# Patient Record
Sex: MALE | Race: WHITE | NOT HISPANIC OR LATINO
[De-identification: names, ages, dates, MRNs, and addresses within clinical notes are randomized per-mention and may not be internally consistent; named-entity substitution may affect disease eponyms.]

---

## 2018-06-23 ENCOUNTER — TRANSCRIPTION ENCOUNTER (OUTPATIENT)
Age: 38
End: 2018-06-23

## 2020-11-10 ENCOUNTER — TRANSCRIPTION ENCOUNTER (OUTPATIENT)
Age: 40
End: 2020-11-10

## 2022-06-16 ENCOUNTER — EMERGENCY (EMERGENCY)
Facility: HOSPITAL | Age: 42
LOS: 1 days | Discharge: HOME | End: 2022-06-16
Admitting: STUDENT IN AN ORGANIZED HEALTH CARE EDUCATION/TRAINING PROGRAM
Payer: COMMERCIAL

## 2022-06-16 VITALS
HEIGHT: 71 IN | DIASTOLIC BLOOD PRESSURE: 88 MMHG | HEART RATE: 75 BPM | RESPIRATION RATE: 16 BRPM | SYSTOLIC BLOOD PRESSURE: 124 MMHG | WEIGHT: 160.06 LBS | TEMPERATURE: 98 F | OXYGEN SATURATION: 97 %

## 2022-06-16 DIAGNOSIS — I86.1 SCROTAL VARICES: ICD-10-CM

## 2022-06-16 DIAGNOSIS — Z91.011 ALLERGY TO MILK PRODUCTS: ICD-10-CM

## 2022-06-16 DIAGNOSIS — R10.2 PELVIC AND PERINEAL PAIN: ICD-10-CM

## 2022-06-16 LAB
APPEARANCE UR: CLEAR — SIGNIFICANT CHANGE UP
BACTERIA # UR AUTO: PRESENT /HPF
BILIRUB UR-MCNC: NEGATIVE — SIGNIFICANT CHANGE UP
COLOR SPEC: YELLOW — SIGNIFICANT CHANGE UP
DIFF PNL FLD: ABNORMAL
EPI CELLS # UR: SIGNIFICANT CHANGE UP /HPF (ref 0–5)
GLUCOSE UR QL: NEGATIVE — SIGNIFICANT CHANGE UP
KETONES UR-MCNC: ABNORMAL MG/DL
LEUKOCYTE ESTERASE UR-ACNC: NEGATIVE — SIGNIFICANT CHANGE UP
NITRITE UR-MCNC: NEGATIVE — SIGNIFICANT CHANGE UP
PH UR: 6.5 — SIGNIFICANT CHANGE UP (ref 5–8)
PROT UR-MCNC: NEGATIVE MG/DL — SIGNIFICANT CHANGE UP
RBC CASTS # UR COMP ASSIST: < 5 /HPF — SIGNIFICANT CHANGE UP
SP GR SPEC: 1.01 — SIGNIFICANT CHANGE UP (ref 1–1.03)
UROBILINOGEN FLD QL: 0.2 E.U./DL — SIGNIFICANT CHANGE UP
WBC UR QL: < 5 /HPF — SIGNIFICANT CHANGE UP

## 2022-06-16 PROCEDURE — 76870 US EXAM SCROTUM: CPT

## 2022-06-16 PROCEDURE — 81001 URINALYSIS AUTO W/SCOPE: CPT

## 2022-06-16 PROCEDURE — 99284 EMERGENCY DEPT VISIT MOD MDM: CPT | Mod: 25

## 2022-06-16 PROCEDURE — 99284 EMERGENCY DEPT VISIT MOD MDM: CPT

## 2022-06-16 PROCEDURE — 76870 US EXAM SCROTUM: CPT | Mod: 26

## 2022-06-16 NOTE — ED PROVIDER NOTE - NSFOLLOWUPINSTRUCTIONS_ED_ALL_ED_FT
Take tylenol 650mg or motrin 400-800mg as needed every 4-6 hours for pain.   You should wear compression briefs to keep scrotum supported    Please call to make appointment in urology clinic within one week located at [address] [place] [address]  [phone]    You may call our referrals coordinator at [phone] Monday to Friday 11am-7pm for assistance with making an appointment      Varicocele       A varicocele is a swelling of veins in the scrotum. The scrotum is the sac that contains the testicles. Varicoceles can occur on either side of the scrotum, but they are more common on the left side. They occur most often in teenage boys and young men.    In most cases, varicoceles are not a serious problem. They are usually small and painless and do not require treatment. Tests may be done to confirm the diagnosis. Treatment may be needed if:  •A varicocele is large, causes a lot of pain, or causes pain when exercising.      •Varicoceles are found on both sides of the scrotum.      •A varicocele causes a decrease in the size of the testicle in a growing adolescent.      •The person has fertility problems.        What are the causes?    This condition is the result of valves in the veins not working properly. Valves in the veins help to return blood from the scrotum and testicles to the heart. If these valves do not work well, blood flows backward and backs up into the veins, which causes the veins to swell. This is similar to what happens when varicose veins form in the leg.      What are the signs or symptoms?    Most varicoceles do not cause any symptoms. If symptoms do occur, they may include:  •Swelling on one side of the scrotum. The swelling may be more obvious when you are standing up.      •A lumpy feeling in the scrotum.      •A heavy feeling on one side of the scrotum.      •A dull ache in the scrotum, especially after exercise or prolonged standing or sitting.      •Slower growth or reduced size of the testicle on the side of the varicocele (in young males).      •Problems with fathering a child (fertility). This can occur if the testicle does not grow normally or if the condition causes problems with the sperm, such as a low sperm count or sperm that are not able to reach the egg (poor motility).        How is this diagnosed?    This condition is diagnosed based on:  •Your medical history.      •A physical exam. Your health care provider may inspect and feel (palpate) the scrotal area to check for swollen or enlarged veins.      •An ultrasound. This may be done to confirm the diagnosis and to help rule out other causes of the swelling.        How is this treated?    Treatment is usually not needed for this condition. If you have any pain, your health care provider may prescribe or recommend medicine to help relieve it. You may need regular exams so your health care provider can monitor the varicocele to ensure that it does not cause problems.    When further treatment is needed, it may involve one of these options:  •Varicocelectomy. This is a surgery in which the swollen veins are tied off so that the flow of blood goes to other veins instead.      •Embolization. In this procedure, a small, thin tube (catheter) is used to place metal coils or other blocking items in the veins. This cuts off the blood flow to the swollen veins.        Follow these instructions at home:    •Take over-the-counter and prescription medicines only as told by your health care provider.      •Wear supportive underwear.      •Use an athletic supporter when participating in sports activities.      •Keep all follow-up visits as told by your health care provider. This is important.        Contact a health care provider if:    •Your pain is increasing.      •You have redness in the affected area.      •Your testicle becomes enlarged, swollen, or painful.      •You have swelling that does not decrease when you are lying down.      •One of your testicles is smaller than the other.        Get help right away if:    •You develop swelling in your legs.      •You have difficulty breathing.        Summary    •Varicocele is a condition in which the veins in the scrotum are swollen or enlarged.      •In most cases, varicoceles do not require treatment.      •Treatment may be needed if you have pain, have problems with infertility, or have a smaller testicle associated with the varicocele.      •In some cases, the condition may be treated with a procedure to cut off the flow of blood to the swollen veins.      This information is not intended to replace advice given to you by your health care provider. Make sure you discuss any questions you have with your health care provider.

## 2022-06-16 NOTE — ED PROVIDER NOTE - OBJECTIVE STATEMENT
42 M p/w L sided groin pain radiating to L testicle after working out 2.5 weeks ago.  reports was improving but then after cycling few miles on Sunday he had worsening pain in L testicle.  went to clinic Tuesday and reports told nothing on exam to f/u with [facility] surgeon.  States he felt swelling in L testicle this morning w/ increased pain and nausea/diaphoresis so sent to PMD and referred here.  pt not sexually active.  no penile dc/scrotal erythema.  denies f/c, headache, dizziness, chest pain, sob, abd pain, nvd, urinary sxs, h/o sti, trauma

## 2022-06-16 NOTE — ED PROVIDER NOTE - CLINICAL SUMMARY MEDICAL DECISION MAKING FREE TEXT BOX
42 M p/w L sided groin pain radiating to L testicle after working out 2.5 weeks ago- not sexually active, no h/o sti, no urinary sxs, no abd pain.  on exam abd soft/nt, : chaperone present  Marcos (only male available)- circumcised, no penile lesions or discharge, no scrotal edema, palpable nontender cord posterior L teste, no palpable inguinal hernia.   UA no infection, + blood informed.  sonogram shows no e/o torsion, + L varicocele noted.  pt informed of results, supportive care and outpt uro f/u.  discussed strict return parameters

## 2022-06-16 NOTE — ED PROVIDER NOTE - CARE PROVIDER_API CALL
Ruperto Helton)  Urology  [address], [address]  [place] [address]  Phone: [phone]  Fax: [phone]  Follow Up Time:     Santo Foley)  Urology  [address]  [place], [place] [id number]  Phone: [phone]  Fax: [phone]  Follow Up Time:

## 2022-06-16 NOTE — ED PROVIDER NOTE - PATIENT PORTAL LINK FT
You can access the FollowMyHealth Patient Portal offered by [facility] by registering at the following website: [email or web address]. By joining [employer]’s FollowMyHealth portal, you will also be able to view your health information using other applications (apps) compatible with our system.

## 2022-06-16 NOTE — ED PROVIDER NOTE - PHYSICAL EXAMINATION
Vitals reviewed  Gen: well appearing, nad, speaking in full sentences  Skin: wwp, no rash/lesions  HEENT: ncat, eomi, mmm  CV: rrr, no audible m/r/g  Resp: symmetrical expansion, ctab, no w/r/r  Abd: nondistended, soft, nontender, no rebound/guarding  : chaperone present  Marcos (only male available)- circumcised, no penile lesions or discharge, no scrotal edema, palpable nontender cord posterior L teste, no palpable inguinal hernia   Ext: FROM throughout, no peripheral edema  Neuro: alert/oriented, no focal deficits, steady gait

## 2022-06-16 NOTE — ED ADULT NURSE NOTE - OBJECTIVE STATEMENT
pt received into spot G A&Ox4 ambulatory appears comfortable arrives via walk in triage for eval of groin pain x 2+ weeks started after some heavy lifting a the gym no n/v/d constipation fever chills pt saw his pmd who referred to ED pt respiration unlabored abd nondistended pt pending US

## 2022-06-16 NOTE — ED ADULT NURSE NOTE - CHIEF COMPLAINT
General: WN/WD NAD  Head: Atraumatic  Eyes: EOM grossly in tact, no scleral icterus  ENT: moist mucous membranes  Neurology: A&Ox3, nonfocal, NETTLES x 4  Respiratory: normal respiratory effort  CV: Extremities warm and well perfused  Abdominal: Soft, non-distended  Extremities: No edema  Skin: + well healed surgical scar above implanted device. mild swelling surrounding device, no crepitus or erythema. The patient is a 42y Male complaining of groin pain.

## 2022-06-16 NOTE — ED ADULT TRIAGE NOTE - HEART RATE (BEATS/MIN)
11:09 AM Recheck on patient. Discussed with patient ED findings and plan for discharge. Patient was given ED warnings, discharge instructions, and follow up information to go home with. Patient understands and agrees with plan for discharge. Any questions have been answered.  Patient refusal of last set of vitals.   
76

## 2022-06-20 PROBLEM — Z78.9 OTHER SPECIFIED HEALTH STATUS: Chronic | Status: ACTIVE | Noted: 2022-06-16

## 2022-06-23 PROBLEM — Z00.00 ENCOUNTER FOR PREVENTIVE HEALTH EXAMINATION: Status: ACTIVE | Noted: 2022-06-23

## 2022-06-27 ENCOUNTER — APPOINTMENT (OUTPATIENT)
Dept: UROLOGY | Facility: CLINIC | Age: 42
End: 2022-06-27

## 2022-06-27 VITALS
HEIGHT: 71 IN | TEMPERATURE: 97.8 F | WEIGHT: 160 LBS | BODY MASS INDEX: 22.4 KG/M2 | DIASTOLIC BLOOD PRESSURE: 82 MMHG | SYSTOLIC BLOOD PRESSURE: 134 MMHG | HEART RATE: 77 BPM

## 2022-06-27 DIAGNOSIS — I86.1 SCROTAL VARICES: ICD-10-CM

## 2022-06-27 DIAGNOSIS — Z87.891 PERSONAL HISTORY OF NICOTINE DEPENDENCE: ICD-10-CM

## 2022-06-27 DIAGNOSIS — Z87.19 PERSONAL HISTORY OF OTHER DISEASES OF THE DIGESTIVE SYSTEM: ICD-10-CM

## 2022-06-27 DIAGNOSIS — N50.82 SCROTAL PAIN: ICD-10-CM

## 2022-06-27 LAB
BILIRUB UR QL STRIP: NORMAL
CLARITY UR: CLEAR
COLLECTION METHOD: NORMAL
GLUCOSE UR-MCNC: NORMAL
HCG UR QL: 0.2 EU/DL
HGB UR QL STRIP.AUTO: NORMAL
KETONES UR-MCNC: NORMAL
LEUKOCYTE ESTERASE UR QL STRIP: NORMAL
NITRITE UR QL STRIP: NORMAL
PH UR STRIP: 8.5
PROT UR STRIP-MCNC: NORMAL
SP GR UR STRIP: 1.02

## 2022-06-27 PROCEDURE — 81003 URINALYSIS AUTO W/O SCOPE: CPT | Mod: QW

## 2022-06-27 PROCEDURE — 99204 OFFICE O/P NEW MOD 45 MIN: CPT

## 2022-06-27 RX ORDER — OMEPRAZOLE 20 MG/1
20 CAPSULE, DELAYED RELEASE ORAL
Refills: 0 | Status: ACTIVE | COMMUNITY

## 2022-06-27 NOTE — PHYSICAL EXAM
[General Appearance - Well Developed] : well developed [Normal Appearance] : normal appearance [Heart Rate And Rhythm] : Heart rate and rhythm were normal [] : no respiratory distress [Abdomen Soft] : soft [Abdomen Tenderness] : non-tender [Abdomen Hernia] : no hernia was discovered [FreeTextEntry1] : pain at left pubic tubercle [Urethral Meatus] : meatus normal [Penis Abnormality] : normal circumcised penis [Epididymis] : the epididymides were normal [Testes Tenderness] : no tenderness of the testes [Testes Mass (___cm)] : there were no testicular masses [Normal Station and Gait] : the gait and station were normal for the patient's age [Skin Color & Pigmentation] : normal skin color and pigmentation [No Focal Deficits] : no focal deficits [Oriented To Time, Place, And Person] : oriented to person, place, and time

## 2022-06-27 NOTE — HISTORY OF PRESENT ILLNESS
[FreeTextEntry1] : 41 yo male referred for recent finding of left varicocele on a scrotal US performed in the ED for scrotal pain.  H notes that about 3-4 weeks ago after exercising with weights that he developed left groin pain.  He felt like he likely pulled a muscle while exercising.  This improved initially but then worsened again when he tried cycling for exercise.  He performed a self exam which elicited left scrotal pain and prompted him to go tot [facility] ED for the US.  The US showed no torsion of testicular masses. (Report below).\par \par  ACC: [id number] EXAM:  US SCROTUM AND CONTENTS                      \par \par PROCEDURE DATE:  06/16/2022  \par \par \par \par INTERPRETATION:  SCROTAL ULTRASOUND with DUPLEX DOPPLER dated 6/16/2022 \par 6:13 PM\par \par INDICATION: Left testicular pain. Evaluate for torsion.\par \par COMPARISON: None.\par \par TECHNIQUE: Ultrasound and duplex Doppler evaluation of scrotum was \par performed using grayscale imaging, color flow Doppler, and spectral \par waveform analysis. Duplex Doppler examination was performed with the \par patient with and without Valsalva in order to assess for varicocele.\par \par FINDINGS:\par \par RIGHT SCROTUM:\par \par Right testis echogenicity: Within normal limits\par Right testis size: 4.0 x 2.1 x 3.0 cm\par Right testis volume: 13.5 ml\par Right testicular lesions: None\par Right testis duplex Doppler evaluation: Within normal limits\par Right epididymis: Within normal limits\par Right hydrocele: None\par \par Right varicocele: Borderline\par Diameter of largest vein: without Valsalva: 0.23 cm; with Valsalva: 0.25 \par cm.\par \par LEFT SCROTUM:\par \par Left testis echogenicity: Within normal limits\par Left testis size: 3.9 x 2.1 x 2.7 cm\par Left testis volume: 11.8 ml\par Left testicular lesions: None\par Left testis duplex Doppler evaluation: Within normal limits\par Left epididymis: Within normal limits\par Left hydrocele: None\par \par Left varicocele: Mild\par Diameter of largest vein: without Valsalva: 0.27 cm; with Valsalva: 0.35 \par cm.\par \par \par IMPRESSION:\par No sonographic evidence of testicular torsion.\par \par --- End of Report ---\par \par The patient has been avoiding exercise for th elast few days and his discomfort has subsided. \par

## 2022-06-27 NOTE — ASSESSMENT
[FreeTextEntry1] : 43 yo male with muscle strain causing left inguinal discomfort also incidentally noted to have left varicoceles.  We discussed varicoceles and their potential impact on fertility.  He is not concerned about fertility at this time.  His pain is from a muscle strain.  I advised him to avoid strenuous exercise or weight lifting for several weeks.  I also advised him to use NSAIDs prn for discomfort.  \par \par Plan:\par 1. NSAIDs prn\par 2. Avoid strenuous exercise for 2-3 weeks\par 3. F/u prn

## 2022-12-08 NOTE — ED ADULT TRIAGE NOTE - ARRIVAL FROM
Okay to prescribe antibiotic amoxicillin 2 g 1 hour before procedure.  
Pt left msg saying he will be seeing the dentist next week and needs antibiotics sent in.  His only Drug allergy is Bystolic.    Thanks,  Sweta   
Sent in Rx./prt  
Home
Stable